# Patient Record
Sex: MALE | Race: WHITE | Employment: OTHER | URBAN - METROPOLITAN AREA
[De-identification: names, ages, dates, MRNs, and addresses within clinical notes are randomized per-mention and may not be internally consistent; named-entity substitution may affect disease eponyms.]

---

## 2018-12-18 ENCOUNTER — OFFICE VISIT (OUTPATIENT)
Dept: URGENT CARE | Facility: URGENT CARE | Age: 68
End: 2018-12-18
Payer: COMMERCIAL

## 2018-12-18 VITALS
SYSTOLIC BLOOD PRESSURE: 132 MMHG | HEART RATE: 98 BPM | DIASTOLIC BLOOD PRESSURE: 92 MMHG | OXYGEN SATURATION: 94 % | TEMPERATURE: 97.8 F

## 2018-12-18 DIAGNOSIS — J22 LOWER RESPIRATORY TRACT INFECTION: Primary | ICD-10-CM

## 2018-12-18 PROCEDURE — 99203 OFFICE O/P NEW LOW 30 MIN: CPT | Performed by: PHYSICIAN ASSISTANT

## 2018-12-18 RX ORDER — AZITHROMYCIN 250 MG/1
TABLET, FILM COATED ORAL
Qty: 6 TABLET | Refills: 0 | Status: SHIPPED | OUTPATIENT
Start: 2018-12-18 | End: 2018-12-23

## 2018-12-18 RX ORDER — CODEINE PHOSPHATE/GUAIFENESIN 10-100MG/5
LIQUID (ML) ORAL
Qty: 120 ML | Refills: 0 | Status: SHIPPED | OUTPATIENT
Start: 2018-12-18

## 2018-12-18 NOTE — PROGRESS NOTES
SUBJECTIVE:   Jaswinder Sparrow is a 68 year old male presenting with a chief complaint of   Chief Complaint   Patient presents with     Urgent Care     URI     Severe cough, coughing phlegm, nasal congestion. Sx x8 days       He is a new patient of [facility].    URI Adult    Onset of symptoms was 10 day(s) ago.  Course of illness is worsening.    Severity moderate  Current and Associated symptoms: cough - productive, nasal congestion, fatigue. Cough worse at night.   Treatment measures tried include OTC Cough med.  Predisposing factors include none.  Denies f/c/n/v/d/sob or chest pain.      Review of Systems   Constitutional: Positive for fatigue. Negative for chills and fever.   HENT: Negative for sore throat.    Respiratory: Positive for cough. Negative for shortness of breath and wheezing.    Gastrointestinal: Negative for diarrhea and vomiting.       Past Medical History:   Diagnosis Date     Cancer (H)      Hypertension      Lymphoma (H)      Polio     residula right LE weakness     Sleep apnea      Family History   Problem Relation Age of Onset     Cerebrovascular Disease Father         late onset     Diabetes Mother      Cardiovascular Mother         late CHF,  at age 74     Cancer - colorectal Sister         early disease     Current Outpatient Medications   Medication Sig Dispense Refill     aspirin 81 MG EC tablet Take 81 mg by mouth daily       azithromycin (ZITHROMAX Z-DOMINGA) 250 MG tablet Take 2 tablets today then 1 daily times 4 days 6 tablet 0     guaiFENesin-codeine (GUAIFENESIN AC) 100-10 MG/5ML syrup Please take 10 ml at bedtime as needed for cough 120 mL 0     hydrochlorothiazide (HYDRODIURIL) 25 MG tablet Take 12.5 mg by mouth daily       METOPROLOL TARTRATE PO Take 25 mg by mouth       Multiple Vitamin (MULTIVITAMINS PO) Take 2 tablets by mouth daily       NIACIN CR PO Take 500 mg by mouth daily        pantoprazole (PROTONIX) 40 MG enteric coated tablet Take 40 mg by mouth every morning        ramipril 10 MG TABS Take 1 tablet by mouth daily       ACYCLOVIR PO        allopurinol (ZYLOPRIM) 300 MG tablet Take 1 tablet (300 mg) by mouth daily (Patient not taking: Reported on 2018) 30 tablet 0     COENZYME Q-10 PO Take 100 mg by mouth daily Take 2 tablets       Cyanocobalamin (VITAMIN B12 PO) Take 1,000 mcg by mouth daily        Eszopiclone (LUNESTA PO) Take 3 mg by mouth       folic acid (FOLVITE) 400 MCG tablet Take 800 mcg by mouth daily       LORAZEPAM PO Take 0.5 mg by mouth every 4 hours as needed for anxiety       NIFEdipine (NIFEDICAL XL) 60 MG TB24 Take 1 tablet by mouth daily       Omega-3 Fatty Acids (OMEGA-3 FISH OIL PO) Take 1 g by mouth daily        Ondansetron HCl (ZOFRAN PO)        OxyCODONE HCl 5 MG TABA Take by mouth every 4 hours as needed       penciclovir (DENAVIR) 1 % cream Apply topically every 2 hours       penicillin V (VEETID) 250 mg/5 mL suspension Take 500 mg by mouth 4 times daily       potassium chloride (K-DUR) 10 MEQ tablet Take 10 mEq by mouth daily       PREDNISONE PO Take 50 mg by mouth Take 100mg daily on days 1-5 of each chemo cycle       prochlorperazine (COMPAZINE) 5 MG tablet Take 1-2 tablets (5-10 mg) by mouth every 6 hours as needed for vomiting (Patient not taking: Reported on 2018) 90 tablet 1     rosuvastatin (CRESTOR) 10 MG tablet Take 10 mg by mouth daily       sulfamethoxazole-trimethoprim (BACTRIM,SEPTRA) 400-80 MG per tablet Take 1 tablet by mouth 2 times daily       Social History     Tobacco Use     Smoking status: Former Smoker     Packs/day: 0.10     Years: 4.00     Pack years: 0.40     Last attempt to quit: 1980     Years since quittin.9     Smokeless tobacco: Never Used   Substance Use Topics     Alcohol use: Yes     Alcohol/week: 7.0 oz     Types: 14 Glasses of wine per week       OBJECTIVE  BP (!) 132/92 (BP Location: Right arm, Patient Position: Chair, Cuff Size: Adult Large)   Pulse 98   Temp 97.8  F (36.6  C) (Oral)   SpO2  94%     Physical Exam   Constitutional: He appears well-developed and well-nourished. No distress.   HENT:   Head: Normocephalic and atraumatic.   Right Ear: Tympanic membrane normal.   Left Ear: Tympanic membrane normal.   Mouth/Throat: Oropharynx is clear and moist.   Eyes: Conjunctivae are normal.   Neck: Normal range of motion.   Cardiovascular: Normal rate and regular rhythm.   Pulmonary/Chest: Effort normal and breath sounds normal. No respiratory distress. He has no wheezes.   Basilar rhonchi   Neurological: He is alert.   Skin: Skin is warm and dry.   Psychiatric: He has a normal mood and affect.   Nursing note and vitals reviewed.      Labs:  No results found for this or any previous visit (from the past 24 hour(s)).    X-Ray was not done.    ASSESSMENT:      ICD-10-CM    1. Lower respiratory tract infection J22 azithromycin (ZITHROMAX Z-DOMINGA) 250 MG tablet     guaiFENesin-codeine (GUAIFENESIN AC) 100-10 MG/5ML syrup        Medical Decision Making:    Differential Diagnosis:  URI Adult/Peds:  Bronchitis, Sinusitis, Viral syndrome and Viral upper respiratory illness    Serious Comorbid Conditions:  Adult:  None    PLAN:    Lower respiratory tract infection: Zithromax Rx. Guaifenesin with codeine as needed for cough. Follow up if any worsening symptoms. He agrees.     Followup:    If not improving or if condition worsens, follow up with your Primary Care Provider    Patient Instructions   Patient to follow up with Primary Care provider regarding elevated blood pressure.

## 2018-12-19 ASSESSMENT — ENCOUNTER SYMPTOMS
FEVER: 0
VOMITING: 0
SHORTNESS OF BREATH: 0
DIARRHEA: 0
CHILLS: 0
FATIGUE: 1
SORE THROAT: 0
WHEEZING: 0
COUGH: 1